# Patient Record
(demographics unavailable — no encounter records)

---

## 2024-10-10 NOTE — HISTORY OF PRESENT ILLNESS
[FreeTextEntry6] : 16 year old female presents to clinic for request of QuantiFERON lab states she is trying to get into program at school and her last lab was years ago states she needs requirement by 10/19 when the program starts

## 2024-10-10 NOTE — DISCUSSION/SUMMARY
[FreeTextEntry1] : 16 year old female presents to clinic for QuantiFERON lab work lab drawn - will notify of abnormal results   [facility] form reviewed - no MH issues/concerns identified CIR reviewed - needs flu and MCV4 - only wants VIS and consent for MCV4 return to clinic as needed

## 2024-10-24 NOTE — DISCUSSION/SUMMARY
[FreeTextEntry1] : 16 year old female presents for MCV4 vaccine administration Student tolerated vaccine administration well without incident Reviewed possible injection site tenderness; patient may apply cool compress or ice pack for tenderness Fever may develop within 24 hours, may utilize ibuprofen or Tylenol If fever persists past 24 hours may need to seek care for possible infection unrelated to vaccine  Return to clinic as needed for any further complaints [] : The components of the vaccine(s) to be administered today are listed in the plan of care. The disease(s) for which the vaccine(s) are intended to prevent and the risks have been discussed with the caretaker.  The risks are also included in the appropriate vaccination information statements which have been provided to the patient's caregiver.  The caregiver has given consent to vaccinate.

## 2024-10-24 NOTE — HISTORY OF PRESENT ILLNESS
[Meningococcal ACWY] : Meningococcal ACWY [FreeTextEntry1] : 16 year year old female presents to clinic for vaccine administration. Feels well today. No complaints of illness at present CIR reviewed, student due for the following vaccines: flu and MCV4 Parental consent signed for student to receive MCV4 Student denies any adverse events to vaccines in the past.

## 2025-05-23 NOTE — DISCUSSION/SUMMARY
[FreeTextEntry1] : 16 year old female presents to clinic for abdominal pain calcium carbonate 500mg tablets x 2 dispensed now simethicone 80mg tablets x 2 dispensed now Based on HPI and physical exam likely patient is having indigestion related to contaminated or spoiled food. Discussed with patient to eat smaller meals, eat slowly; avoid fast food, fried food, and fatty foods. Encouraged a bland diet until symptoms resolve. Encouraged patient to increase fluid intake   Return to clinic for new or worsening symptoms

## 2025-05-23 NOTE — HISTORY OF PRESENT ILLNESS
[FreeTextEntry6] : 16 year old female presents to clinic with c/o abdominal pain student crying and doubled over in pain hot pack given while waiting c/o left sided abdominal pain 8-9/10 denies n/v/d or constipation states last BM last night or this morning - regular soft stool LMP 4/10, not sexually active had tea with bread and cream cheese for breakfast  stretched 3 days ago but not a strenuous workout

## 2025-05-23 NOTE — PHYSICAL EXAM
[NL] : no acute distress, alert [Soft] : soft [Tender] : tender [Normal Bowel Sounds] : normal bowel sounds [Distended] : nondistended [FreeTextEntry9] : tenderness on palpation of left upper and mid abdominal area